# Patient Record
Sex: FEMALE | Race: WHITE | NOT HISPANIC OR LATINO | Employment: OTHER | URBAN - METROPOLITAN AREA
[De-identification: names, ages, dates, MRNs, and addresses within clinical notes are randomized per-mention and may not be internally consistent; named-entity substitution may affect disease eponyms.]

---

## 2018-08-01 ENCOUNTER — OFFICE VISIT (OUTPATIENT)
Dept: ENDOCRINOLOGY | Facility: HOSPITAL | Age: 68
End: 2018-08-01
Payer: COMMERCIAL

## 2018-08-01 VITALS
HEIGHT: 67 IN | HEART RATE: 82 BPM | SYSTOLIC BLOOD PRESSURE: 120 MMHG | WEIGHT: 193.8 LBS | BODY MASS INDEX: 30.42 KG/M2 | DIASTOLIC BLOOD PRESSURE: 78 MMHG

## 2018-08-01 DIAGNOSIS — E89.0 POSTOPERATIVE HYPOTHYROIDISM: Primary | ICD-10-CM

## 2018-08-01 PROCEDURE — 99203 OFFICE O/P NEW LOW 30 MIN: CPT | Performed by: INTERNAL MEDICINE

## 2018-08-01 RX ORDER — DESOXIMETASONE 2.5 MG/G
OINTMENT TOPICAL
COMMUNITY
Start: 2018-07-23 | End: 2020-09-03 | Stop reason: ALTCHOICE

## 2018-08-01 RX ORDER — GABAPENTIN 600 MG/1
TABLET, FILM COATED ORAL DAILY
COMMUNITY
Start: 2018-07-09 | End: 2019-08-12 | Stop reason: ALTCHOICE

## 2018-08-01 RX ORDER — LEVOTHYROXINE SODIUM 0.12 MG/1
125 TABLET ORAL DAILY
Qty: 90 TABLET | Refills: 2 | Status: SHIPPED | OUTPATIENT
Start: 2018-08-01 | End: 2019-05-15 | Stop reason: SDUPTHER

## 2018-08-01 RX ORDER — LEVOTHYROXINE SODIUM 125 UG/1
125 TABLET ORAL DAILY
COMMUNITY
Start: 2018-06-12 | End: 2018-08-01 | Stop reason: SDUPTHER

## 2018-08-01 RX ORDER — ROSUVASTATIN CALCIUM 5 MG/1
5 TABLET, COATED ORAL DAILY
COMMUNITY

## 2018-08-01 RX ORDER — METOPROLOL SUCCINATE 25 MG
25 TABLET, EXTENDED RELEASE 24 HR ORAL DAILY
COMMUNITY
Start: 2018-06-27

## 2018-08-01 RX ORDER — ASPIRIN 81 MG/1
81 TABLET, CHEWABLE ORAL
COMMUNITY

## 2018-08-01 NOTE — PATIENT INSTRUCTIONS
Thyroid level is normal    No change in Synthroid dose for now  Follow up in 1 year with blood work

## 2018-08-01 NOTE — PROGRESS NOTES
8/1/2018    Assessment/Plan      Diagnoses and all orders for this visit:    Postoperative hypothyroidism  -     TSH, 3rd generation Lab Collect; Future  -     T4, free Lab Collect; Future  -     levothyroxine (SYNTHROID) 125 mcg tablet; Take 1 tablet (125 mcg total) by mouth daily    Other orders  -     aspirin 81 mg chewable tablet; Chew 81 mg  -     cholecalciferol (VITAMIN D3) 1,000 units tablet; Take 1,000 Units by mouth  -     desoximetasone (TOPICORT) 0 25 % ointment;   -     GRALISE 600 MG TABS; daily    -     Discontinue: SYNTHROID 125 MCG tablet; Take 125 mcg by mouth daily    -     TOPROL XL 25 MG 24 hr tablet; Take 25 mg by mouth daily    -     rosuvastatin (CRESTOR) 5 mg tablet; Take 5 mg by mouth daily        Assessment/Plan:  Hypothyroidism post thyroidectomy  Most recent thyroid function tests are normal  She is biochemically and clinically euthyroid  She will continue the same Synthroid 125 mcg daily  I will have her follow up in 1 year with preceding TSH and free T4       CC: thyroid consult    History of Present Illness     HPI: Savi Her is a 77y o  year old female with history of hypothyroidism post thyroidectomy  She had a history of hyperthyroidism and had total  thyroidectomy in the past   She is currently taking Synthroid 125 mcg daily  She denies heat or cold intolerance, but tends to be on the warmer side  She denies diarrhea or constipation, anxiety or depression, insomnia, tremors, or weight changes  She does have some fatigue recently and has been napping some in the afternoon  She will have occasional palpitations  She has no brittle nails or hair loss but has the red and dry and swollen skin of her hands which is under evaluation and treatment by a dermatologist   She denies diplopia  Review of Systems   Constitutional: Positive for fatigue  Negative for unexpected weight change  Has been more fatigued recently and napping in the afternoon or watching TV  HENT: Negative for hearing loss and tinnitus  Eyes: Positive for visual disturbance  Last fall 2017, lost vision on the right like a shade  Giant cell arteritis ruled out  She was in the ER and told she had a TIA  She saw neurology, had MRI, and blood work  She saw cardiology  She started to get sciatic pain down leg at that time  Had back evaluation too  Then leg started itching a lot and had shingles  Now she is gabepentin  No diplopia  Respiratory: Negative for chest tightness and shortness of breath  Cardiovascular: Positive for palpitations  Negative for chest pain and leg swelling  Occasional palpitations  Gastrointestinal: Negative for abdominal pain, constipation, diarrhea and nausea  Endocrine: Negative for cold intolerance and heat intolerance  Tends to be on the warmer side  Musculoskeletal: Negative for arthralgias and back pain  Knee pain in the right if seated awhile and then gets up  Skin: Positive for rash  Hands always dry, red, and swollen  Lots of negative blood work per rheum  Seeing derm now and on steroid cream  Has a kind of dermatitis and eczema  No other dry skin, brittle nails, or hair loss  Neurological: Positive for numbness  Negative for dizziness, tremors, light-headedness and headaches  Has neuropathy and sensitivity in left leg to foot post shingles infection last year and is on gabapentin  Psychiatric/Behavioral: Negative for dysphoric mood and sleep disturbance  The patient is not nervous/anxious  Occasional bad sleep night  Historical Information   No past medical history on file  No past surgical history on file    Social History   History   Alcohol use Not on file     History   Drug use: Unknown     History   Smoking Status    Former Smoker    Packs/day: 1 00    Years: 15 00    Types: Cigarettes    Quit date: 1979   Smokeless Tobacco    Never Used     Family History:   Family History   Problem Relation Age of Onset    Depression Mother     No Known Problems Father     Thyroid cancer Sister     Thyroid disease unspecified Sister     Diabetes type I Grandchild        Meds/Allergies   Current Outpatient Prescriptions   Medication Sig Dispense Refill    aspirin 81 mg chewable tablet Chew 81 mg      cholecalciferol (VITAMIN D3) 1,000 units tablet Take 1,000 Units by mouth      desoximetasone (TOPICORT) 0 25 % ointment       GRALISE 600 MG TABS       rosuvastatin (CRESTOR) 5 mg tablet Take 5 mg by mouth daily      SYNTHROID 125 MCG tablet       TOPROL XL 25 MG 24 hr tablet        No current facility-administered medications for this visit  Allergies   Allergen Reactions    Tetracycline        Objective   Vitals: Blood pressure 120/78, pulse 82, height 5' 7" (1 702 m), weight 87 9 kg (193 lb 12 8 oz)  Invasive Devices          No matching active lines, drains, or airways          Physical Exam   Constitutional: She is oriented to person, place, and time  She appears well-developed and well-nourished  HENT:   Head: Normocephalic and atraumatic  Eyes: Conjunctivae and EOM are normal  Pupils are equal, round, and reactive to light  No lid lag, stare, proptosis  , or periorbital edema  Neck: Normal range of motion  Neck supple  No palpable thyroid nodules  Cardiovascular: Normal rate, regular rhythm, normal heart sounds and intact distal pulses  No murmur heard  Pulmonary/Chest: Effort normal and breath sounds normal  She has no wheezes  Abdominal: Soft  Bowel sounds are normal  There is no tenderness  Musculoskeletal: Normal range of motion  She exhibits no edema or deformity  No tremor of the outstretched hands  No spinous process tenderness  No CVA tenderness  Lymphadenopathy:     She has no cervical adenopathy  Neurological: She is alert and oriented to person, place, and time  She has normal reflexes  Skin: Skin is warm and dry  No rash noted     Vitals reviewed  The history was obtained from the review of the chart and from the patient  Lab Results:    Most recent blood work doneon 06/05/2018 at [facility] showed a normal CMP with a glucose of 97  TSH is 0 357 with a free T4 1  16  CBC was normal  And 25 hydroxy vitamin-D level was slightly low at 28 5  No results found for this or any previous visit (from the past 83566 hour(s))  No future appointments

## 2019-05-15 DIAGNOSIS — E89.0 POSTOPERATIVE HYPOTHYROIDISM: ICD-10-CM

## 2019-05-15 RX ORDER — LEVOTHYROXINE SODIUM 0.12 MG/1
TABLET ORAL
Qty: 90 TABLET | Refills: 2 | Status: SHIPPED | OUTPATIENT
Start: 2019-05-15 | End: 2020-02-10

## 2019-07-09 LAB
T4 FREE SERPL-MCNC: 1.3 NG/DL (ref 0.8–1.8)
TSH SERPL-ACNC: 1.29 MIU/L (ref 0.4–4.5)

## 2019-08-12 ENCOUNTER — OFFICE VISIT (OUTPATIENT)
Dept: ENDOCRINOLOGY | Facility: HOSPITAL | Age: 69
End: 2019-08-12
Payer: COMMERCIAL

## 2019-08-12 VITALS
BODY MASS INDEX: 30.61 KG/M2 | HEART RATE: 64 BPM | HEIGHT: 67 IN | WEIGHT: 195 LBS | SYSTOLIC BLOOD PRESSURE: 128 MMHG | DIASTOLIC BLOOD PRESSURE: 80 MMHG

## 2019-08-12 DIAGNOSIS — E89.0 POSTOPERATIVE HYPOTHYROIDISM: Primary | ICD-10-CM

## 2019-08-12 DIAGNOSIS — Z86.39 HISTORY OF GRAVES' DISEASE: ICD-10-CM

## 2019-08-12 PROCEDURE — 99213 OFFICE O/P EST LOW 20 MIN: CPT | Performed by: INTERNAL MEDICINE

## 2019-08-12 RX ORDER — MYCOPHENOLATE MOFETIL 500 MG/1
TABLET ORAL EVERY 12 HOURS SCHEDULED
COMMUNITY
End: 2020-09-03 | Stop reason: ALTCHOICE

## 2019-08-12 NOTE — PATIENT INSTRUCTIONS
The thyroid blood work is normal    Continue the same levothyroxine 125 mcg daily  Follow up in 1 year with blood work

## 2019-08-12 NOTE — PROGRESS NOTES
8/12/2019    Assessment/Plan      Diagnoses and all orders for this visit:    Postoperative hypothyroidism  -     TSH, 3rd generation Lab Collect; Future  -     T4, free Lab Collect; Future    History of Graves' disease  -     TSH, 3rd generation Lab Collect; Future  -     T4, free Lab Collect; Future    Other orders  -     mycophenolate (CELLCEPT) 500 mg tablet; Take by mouth every 12 (twelve) hours        Assessment/Plan:    Hypothyroidism post thyroidectomy for Graves disease  Most recent thyroid function tests are normal   She is biochemically euthyroid  She will continue the same levothyroxine 125 mcg daily  I have asked her to follow up in 1 year with preceding TSH and free T4       CC:   Hypothyroid follow-up    History of Present Illness     HPI: Denver Hitch is a 68y o  year old female with history of   Hypothyroidism post thyroidectomy  She has a history of hyperthyroidism due to grave's disease and then had a total thyroidectomy in the past   She is currently on thyroid hormone replacement and takes  Levothyroxine 125 mcg daily  She denies heat or cold intolerance, palpitation, diarrhea or constipation, tremors, anxiety or depression  She will get a rare heart flutter in sees a cardiologist on a regular basis  She has dry skin on her hands and 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 diagnosed with dermatomyositis  She denies any other dry skin, brittle nails, or hair loss  She denies insomnia  She is fatigued  Weight is stable  She has no diplopia  She has no compressive thyroid symptoms or difficulties with swallowing  Review of Systems   Constitutional: Positive for fatigue  Negative for unexpected weight change  Has some fatigue  HENT: Negative for trouble swallowing  Eyes: Negative for visual disturbance  No diplopia  Respiratory: Negative for chest tightness and shortness of breath  Cardiovascular: Negative for chest pain and palpitations  Rare heart flutter   Sees a cardiologist at times  Gastrointestinal: Negative for abdominal pain, constipation, diarrhea and nausea  Endocrine: Negative for cold intolerance and heat intolerance  Skin:        Has been diagnosed with dermatomyositis with hand issues, redness, dryness, edema, unable to make a tight fist  Now on Cellcept  Has had significant cancer workup with this diagnosis, now has pulmonary fibrosis and seeing pulmonary  No other dry skin  No brittle nails or hair loss  Neurological: Negative for dizziness, tremors, light-headedness and headaches  Can get an occasional headache  Psychiatric/Behavioral: Negative for dysphoric mood and sleep disturbance  The patient is not nervous/anxious          Historical Information   Past Medical History:   Diagnosis Date    [facility]    Hyperlipidemia     Hypertension     Lung nodule     right    [facility]     Shingles 2017    TIA (transient ischemic attack) 2017    Vitamin D deficiency      Past Surgical History:   Procedure Laterality Date    APPENDECTOMY      CARPAL TUNNEL RELEASE Bilateral     TOE SURGERY Left     5th toe for bone spur    TONSILLECTOMY      TOTAL THYROIDECTOMY      TUBAL LIGATION Bilateral      Social History   Social History     Substance and Sexual Activity   Alcohol Use Yes    Frequency: Monthly or less    Comment: occasional     Social History     Substance and Sexual Activity   Drug Use No     Social History     Tobacco Use   Smoking Status Former Smoker    Packs/day: 1 00    Years: 15 00    Pack years: 15 00    Types: Cigarettes    Last attempt to quit:     Years since quittin 6   Smokeless Tobacco Never Used     Family History:   Family History   Problem Relation Age of Onset    Depression Mother     No Known Problems Father     Thyroid cancer Sister     Thyroid disease unspecified Sister         post thyroidectomy with C cell hyperplasia    Diabetes type I Grandchild  No Known Problems Brother     No Known Problems Daughter     No Known Problems Daughter     No Known Problems Son        Meds/Allergies   Current Outpatient Medications   Medication Sig Dispense Refill    aspirin 81 mg chewable tablet Chew 81 mg      cholecalciferol (VITAMIN D3) 1,000 units tablet Take 1,000 Units by mouth      desoximetasone (TOPICORT) 0 25 % ointment       levothyroxine 125 mcg tablet TAKE 1 TABLET DAILY 90 tablet 2    mycophenolate (CELLCEPT) 500 mg tablet Take by mouth every 12 (twelve) hours      rosuvastatin (CRESTOR) 5 mg tablet Take 5 mg by mouth daily      TOPROL XL 25 MG 24 hr tablet Take 25 mg by mouth daily         No current facility-administered medications for this visit  Allergies   Allergen Reactions    Tetracycline        Objective   Vitals: Blood pressure 128/80, pulse 64, height 5' 7" (1 702 m), weight 88 5 kg (195 lb)  Invasive Devices     None                 Physical Exam   Constitutional: She is oriented to person, place, and time  She appears well-developed and well-nourished  HENT:   Head: Normocephalic and atraumatic  Eyes: Pupils are equal, round, and reactive to light  Conjunctivae and EOM are normal      No lid lag, stare, proptosis, or periorbital edema  Neck: Normal range of motion  Neck supple  No thyromegaly present  Thyroid normal in size without palpable thyroid nodules  No bruits over the thyroid gland or carotids  Cardiovascular: Normal rate, regular rhythm and normal heart sounds  No murmur heard  Pulmonary/Chest: Effort normal and breath sounds normal  She has no wheezes  Musculoskeletal: Normal range of motion  She exhibits no edema or deformity  No tremor of the outstretched hands  Lymphadenopathy:     She has no cervical adenopathy  Neurological: She is alert and oriented to person, place, and time  She has normal reflexes  Deep tendon reflexes normal without briskness  Skin: Skin is warm and dry   No rash noted    Vitals reviewed  The history was obtained from the review of the chart and from the patient      Lab Results:      Blood work done on 07/08/2019 demonstrates the following results:  Lab Results   Component Value Date    TSH 1 29 07/08/2019    FREET4 1 3 07/08/2019         Future Appointments   Date Time Provider Eusebio Mcghee   8/17/2020 11:20 AM Ryan Gates MD [facility]

## 2020-02-09 DIAGNOSIS — E89.0 POSTOPERATIVE HYPOTHYROIDISM: ICD-10-CM

## 2020-02-10 RX ORDER — LEVOTHYROXINE SODIUM 0.12 MG/1
TABLET ORAL
Qty: 90 TABLET | Refills: 4 | Status: SHIPPED | OUTPATIENT
Start: 2020-02-10 | End: 2021-05-05

## 2020-08-14 ENCOUNTER — TELEPHONE (OUTPATIENT)
Dept: ENDOCRINOLOGY | Facility: HOSPITAL | Age: 70
End: 2020-08-14

## 2020-08-14 DIAGNOSIS — Z86.39 HISTORY OF GRAVES' DISEASE: ICD-10-CM

## 2020-08-14 DIAGNOSIS — E89.0 POSTOPERATIVE HYPOTHYROIDISM: Primary | ICD-10-CM

## 2020-08-25 LAB
T4 FREE SERPL-MCNC: 1.3 NG/DL (ref 0.8–1.8)
TSH SERPL-ACNC: 0.19 MIU/L (ref 0.4–4.5)

## 2020-09-03 ENCOUNTER — OFFICE VISIT (OUTPATIENT)
Dept: ENDOCRINOLOGY | Facility: HOSPITAL | Age: 70
End: 2020-09-03
Payer: COMMERCIAL

## 2020-09-03 VITALS
HEIGHT: 67 IN | BODY MASS INDEX: 31.55 KG/M2 | DIASTOLIC BLOOD PRESSURE: 84 MMHG | WEIGHT: 201 LBS | SYSTOLIC BLOOD PRESSURE: 124 MMHG | TEMPERATURE: 98.2 F | HEART RATE: 78 BPM

## 2020-09-03 DIAGNOSIS — E89.0 POSTOPERATIVE HYPOTHYROIDISM: Primary | ICD-10-CM

## 2020-09-03 DIAGNOSIS — Z86.39 HISTORY OF GRAVES' DISEASE: ICD-10-CM

## 2020-09-03 PROCEDURE — 99213 OFFICE O/P EST LOW 20 MIN: CPT | Performed by: INTERNAL MEDICINE

## 2020-09-03 NOTE — PROGRESS NOTES
9/4/2020    Assessment/Plan      Diagnoses and all orders for this visit:    Postoperative hypothyroidism  -     TSH, 3rd generation Lab Collect; Future  -     T4, free Lab Collect; Future  -     TSH, 3rd generation Lab Collect; Future  -     T4, free Lab Collect; Future    History of Graves' disease  -     TSH, 3rd generation Lab Collect; Future  -     T4, free Lab Collect; Future  -     TSH, 3rd generation Lab Collect; Future  -     T4, free Lab Collect; Future        Assessment/Plan:  1  Hypothyroidism post thyroidectomy  Most recent thyroid function tests do show that her thyroid is a bit on the overactive side  She is having no symptoms of hyperthyroidism at this point in that she has been on the same thyroid hormone dosage for quite a few years without any change needed  For now, continue the same levothyroxine 125 mcg daily  I will repeat her TSH and free T4 in 6 months and if her TSH is still low then I will make an adjustment in thyroid hormone dosage  She was asked to call if she has any symptoms of hyperthyroidism and we can adjust her thyroid hormone dosage sooner  2  Graves disease  She has no evidence of Graves ophthalmopathy  She will get a TSH and free T4 in 6 months  I have asked her to follow up in 1 year with preceding TSH and free T4       CC:  Hypothyroid follow-up    History of Present Illness     HPI: Ruby Zamarripa is a 65y o  year old female with history of hypothyroidism post thyroidectomy for Graves disease  She was diagnosed with hyperthyroidism due to Graves disease many years ago and had a total thyroidectomy in the past   She is currently on thyroid hormone for replacement purposes  She takes levothyroxine 125 mcg daily  Weight is 6 lb more than last visit  She does have a very rare palpitation  She denies tremors or fatigue  She does get very hot in the heat but normally has no heat or cold intolerance    She denies diarrhea or constipation, anxiety or depression, or insomnia  She has some dry skin of her hands but no brittle nails or hair loss  She denies diplopia  She denies compressive thyroid symptoms or difficulties with swallowing  She reports that she was off her Cellcept for her dermatomyositis when COVID started and now her dermatomyositis of her hands is significantly worsening and she should be going back on the Cellcept medications soon  Review of Systems   Constitutional: Positive for unexpected weight change  Negative for fatigue  Weight 6 lb more than last year  will be caring for grandkids soon as school starts  Has fatigue  HENT: Negative for trouble swallowing  Eyes: Negative for visual disturbance  No diplopia  Respiratory: Negative for chest tightness and shortness of breath  Cardiovascular: Positive for palpitations  Negative for chest pain  Very rare palpitations  Gastrointestinal: Negative for abdominal pain, constipation, diarrhea and nausea  Endocrine: Positive for heat intolerance  Negative for cold intolerance  Very hot in the heat, not as tolerant to the heat  Skin: Positive for rash  Has dermatomyositis on palms acting up off Cellcept since march 2020, needs to restart  Some dry skin of the hands  No brittle nails  No hair loss  Neurological: Positive for headaches  Negative for dizziness, tremors and light-headedness  Occasional headaches  Psychiatric/Behavioral: Negative for dysphoric mood and sleep disturbance  The patient is not nervous/anxious          Historical Information   Past Medical History:   Diagnosis Date    [facility] 2019    Hyperlipidemia     Hypertension     Lung nodule     right    Pulmonary fibrosis [facility]     Shingles fall 2017    TIA (transient ischemic attack) fall 2017    Vitamin D deficiency      Past Surgical History:   Procedure Laterality Date    APPENDECTOMY      CARPAL TUNNEL RELEASE Bilateral     TOE SURGERY Left 5th toe for bone spur    TONSILLECTOMY      TOTAL THYROIDECTOMY      TUBAL LIGATION Bilateral      Social History   Social History     Substance and Sexual Activity   Alcohol Use Yes    Frequency: Monthly or less    Comment: occasional     Social History     Substance and Sexual Activity   Drug Use No     Social History     Tobacco Use   Smoking Status Former Smoker    Packs/day:     Years: 15     Pack years: 15     Types: Cigarettes    Last attempt to quit:     Years since quittin 7   Smokeless Tobacco Never Used     Family History:   Family History   Problem Relation Age of Onset    Depression Mother     No Known Problems Father     Thyroid cancer Sister     Thyroid disease unspecified Sister         post thyroidectomy with C cell hyperplasia    Diabetes type I Grandchild     No Known Problems Brother     No Known Problems Daughter     No Known Problems Daughter     No Known Problems Son        Meds/Allergies   Current Outpatient Medications   Medication Sig Dispense Refill    aspirin 81 mg chewable tablet Chew 81 mg      cholecalciferol (VITAMIN D3) 1,000 units tablet Take 1,000 Units by mouth      levothyroxine 125 mcg tablet TAKE 1 TABLET DAILY 90 tablet 4    rosuvastatin (CRESTOR) 5 mg tablet Take 5 mg by mouth daily      TOPROL XL 25 MG 24 hr tablet Take 25 mg by mouth daily         No current facility-administered medications for this visit  Allergies   Allergen Reactions    Tetracycline        Objective   Vitals: Blood pressure 124/84, pulse 78, temperature 98 2 °F (36 8 °C), height 5' 7" (1 702 m), weight 91 2 kg (201 lb)  Invasive Devices     None                 Physical Exam  Vitals signs reviewed  Constitutional:       Appearance: Normal appearance  She is well-developed  HENT:      Head: Normocephalic and atraumatic  Eyes:      Extraocular Movements: Extraocular movements intact        Conjunctiva/sclera: Conjunctivae normal       Comments: No lid lag, stare, proptosis, or periorbital edema  Neck:      Musculoskeletal: Normal range of motion and neck supple  Thyroid: No thyromegaly  Vascular: No carotid bruit  Comments: Healed anterior neck scar  No palpable thyroid tissue  No lymphadenopathy  Cardiovascular:      Rate and Rhythm: Normal rate and regular rhythm  Heart sounds: Normal heart sounds  No murmur  Pulmonary:      Effort: Pulmonary effort is normal       Breath sounds: Normal breath sounds  No wheezing  Abdominal:      Palpations: Abdomen is soft  Musculoskeletal: Normal range of motion  General: No deformity  Right lower leg: No edema  Left lower leg: No edema  Comments: No tremor of the outstretched hands  Lymphadenopathy:      Cervical: No cervical adenopathy  Skin:     General: Skin is warm and dry  Findings: Rash present  Comments: Skin reddened on the palms of her hands  Multiple areas of cracking and dry skin of her fingers  Neurological:      Mental Status: She is alert and oriented to person, place, and time  Deep Tendon Reflexes: Reflexes are normal and symmetric  Comments: Deep tendon reflexes normal          The history was obtained from the review of the chart and from the patient      Lab Results:      Blood work done on 08/24/2020 demonstrates the following results:    Lab Results   Component Value Date    TSH 0 19 (L) 08/24/2020    FREET4 1 3 08/24/2020         Future Appointments   Date Time Provider Eusebio Mcghee   9/2/2021  4:00 PM Jovan Ghotra MD [facility]

## 2020-09-03 NOTE — PATIENT INSTRUCTIONS
The thyroid blood work is a bit on the overactive side  This can cause some of the palpitations or feelings of being hot  For now, no change in levothyroxine 125 mcg daily  Recheck thyroid blood work in 6 months  Follow up in 1 year with blood work

## 2021-01-28 ENCOUNTER — TELEPHONE (OUTPATIENT)
Dept: ENDOCRINOLOGY | Facility: HOSPITAL | Age: 71
End: 2021-01-28

## 2021-01-28 DIAGNOSIS — E89.0 POSTOPERATIVE HYPOTHYROIDISM: Primary | ICD-10-CM

## 2021-01-28 NOTE — TELEPHONE ENCOUNTER
Reviewed her most recent thyroid lab work  It looks like her TSH is still low, and her free T4 is borderline high  Per Dr Jakie Riedel last note from her last office visit, I would recommend decreasing her levothyroxine  She can take 125 mcg 6 days a week, half tablet on Sunday will recheck lab work in 6 weeks in see if further adjustments need to be made

## 2021-01-29 ENCOUNTER — TELEPHONE (OUTPATIENT)
Dept: ENDOCRINOLOGY | Facility: HOSPITAL | Age: 71
End: 2021-01-29

## 2021-01-29 NOTE — TELEPHONE ENCOUNTER
----- Message from Kendra Foster, Matt Nelson sent at 1/29/2021  8:02 AM EST -----  Regarding: FW: Non-Urgent Medical Question  Contact: [phone]  Is there a way to make them visible?  ----- Message -----  From: David Granda  Sent: 1/28/2021   7:25 PM EST  To: , #  Subject: Non-Urgent Medical Question                      Will the latest thyroid bloodwork results be posted in my chart? I know the results were low for TSH and high for T3 and was advised to adjust dose of synthroid on Sundays to 1/2 a tablet  I would just like to see the number results  Thank you

## 2021-03-09 LAB — TSH SERPL-ACNC: 0.27 MIU/L (ref 0.4–4.5)

## 2021-03-12 DIAGNOSIS — E89.0 POSTOPERATIVE HYPOTHYROIDISM: Primary | ICD-10-CM

## 2021-03-30 DIAGNOSIS — Z23 ENCOUNTER FOR IMMUNIZATION: ICD-10-CM

## 2021-04-24 LAB
T4 FREE SERPL-MCNC: 1.4 NG/DL (ref 0.8–1.8)
TSH SERPL-ACNC: 0.96 MIU/L (ref 0.4–4.5)

## 2021-05-05 DIAGNOSIS — E89.0 POSTOPERATIVE HYPOTHYROIDISM: ICD-10-CM

## 2021-05-05 RX ORDER — LEVOTHYROXINE SODIUM 0.12 MG/1
TABLET ORAL
Qty: 90 TABLET | Refills: 3 | Status: SHIPPED | OUTPATIENT
Start: 2021-05-05 | End: 2022-05-02

## 2021-08-24 LAB
T4 FREE SERPL-MCNC: 1.2 NG/DL (ref 0.8–1.8)
TSH SERPL-ACNC: 2.67 MIU/L (ref 0.4–4.5)

## 2021-09-02 ENCOUNTER — OFFICE VISIT (OUTPATIENT)
Dept: ENDOCRINOLOGY | Facility: HOSPITAL | Age: 71
End: 2021-09-02
Payer: COMMERCIAL

## 2021-09-02 VITALS
HEIGHT: 67 IN | DIASTOLIC BLOOD PRESSURE: 90 MMHG | HEART RATE: 88 BPM | WEIGHT: 204.2 LBS | SYSTOLIC BLOOD PRESSURE: 140 MMHG | BODY MASS INDEX: 32.05 KG/M2

## 2021-09-02 DIAGNOSIS — Z86.39 HISTORY OF GRAVES' DISEASE: ICD-10-CM

## 2021-09-02 DIAGNOSIS — E89.0 POSTOPERATIVE HYPOTHYROIDISM: Primary | ICD-10-CM

## 2021-09-02 PROCEDURE — 99213 OFFICE O/P EST LOW 20 MIN: CPT | Performed by: INTERNAL MEDICINE

## 2021-09-02 RX ORDER — MYCOPHENOLATE MOFETIL 500 MG/1
1000 TABLET ORAL EVERY 12 HOURS SCHEDULED
COMMUNITY
Start: 2021-08-09

## 2021-09-02 NOTE — PATIENT INSTRUCTIONS
The thyroid blood work is normal    Continue same levothyroxine 125 mcg daily  Follow up in 1 year with blood work, we'll recheck blood work in 6 months

## 2021-09-02 NOTE — PROGRESS NOTES
9/3/2021    Assessment/Plan      Diagnoses and all orders for this visit:    Postoperative hypothyroidism  -     TSH, 3rd generation Lab Collect; Future  -     T4, free Lab Collect; Future  -     TSH, 3rd generation Lab Collect; Future  -     T4, free Lab Collect; Future    History of Graves' disease  -     TSH, 3rd generation Lab Collect; Future  -     T4, free Lab Collect; Future  -     TSH, 3rd generation Lab Collect; Future  -     T4, free Lab Collect; Future    Other orders  -     mycophenolate (CELLCEPT) 500 mg tablet; 1,000 mg every 12 (twelve) hours         Assessment/Plan:    1  Hypothyroidism post thyroidectomy  Most recent thyroid function tests are normal   She has had fluctuating blood work at times so I will repeat her blood work soon  She is clinically euthyroid and will continue the same levothyroxine 125 mcg daily  2  History of Graves disease  She has no evidence of Graves ophthalmopathy  I have asked her to follow up in 1 year with preceding TSH and free T4 but I will have her repeat a TSH and free T4 in 6 months  CC:   Hypothyroid follow-up    History of Present Illness     HPI: [facility] is a 66y o  year old female with history of  Hypothyroidism post thyroidectomy for hyperthyroidism due to Graves disease  She was diagnosed with hyperthyroidism due to Graves disease many years ago and had a total thyroidectomy  She is currently on thyroid hormone for replacement purposes  She takes levothyroxine 125 mcg 1 tablet 6 days a week and none on sunday  She can be fatigued at times  She has no insomnia, diarrhea constipation, anxiety or depression  She denies heat or cold intolerance  She has occasional palpitations  She denies tremors or weight changes  She has some unchanged dry skin but her dermatomyositis rash is improved with restarting CellCept  She has some increase in hair loss but no brittle nails  She denies diplopia    She denies compressive thyroid symptoms or difficulties with swallowing  Has now had covid vaccine and a booster  Review of Systems   Constitutional: Positive for fatigue  Negative for unexpected weight change  Can be tired at times  HENT: Negative for trouble swallowing  Eyes: Negative for visual disturbance  No diplopia  Respiratory: Negative for chest tightness and shortness of breath  Cardiovascular: Positive for palpitations  Negative for chest pain  Occasional palpitations  Gastrointestinal: Negative for abdominal pain, constipation, diarrhea and nausea  Endocrine: Negative for cold intolerance and heat intolerance  Skin: Negative for rash  Some dry skin unchanged  Dermatomyositis rash on hands improved and back on Cellcept  No brittle nails Some increase in hair loss  Neurological: Positive for headaches  Negative for dizziness, tremors and light-headedness  Some increase in headaches  Psychiatric/Behavioral: Negative for dysphoric mood and sleep disturbance  The patient is not nervous/anxious   to have hip replacement in sept         Historical Information   Past Medical History:   Diagnosis Date    Dermatomyos[facility] ([facility] [address] ) 2019    Hyperlipidemia     Hypertension     Lung nodule     right    [facility]     Shingles fall 2017    TIA (transient ischemic attack) fall 2017    Vitamin D deficiency      Past Surgical History:   Procedure Laterality Date    APPENDECTOMY      CARPAL TUNNEL RELEASE Bilateral     TOE SURGERY Left     5th toe for bone spur    TONSILLECTOMY      TOTAL THYROIDECTOMY      TUBAL LIGATION Bilateral      Social History   Social History     Substance and Sexual Activity   Alcohol Use Yes    Comment: occasional     Social History     Substance and Sexual Activity   Drug Use No     Social History     Tobacco Use   Smoking Status Former Smoker    Packs/day: 1 00    Years: 15 00    Pack years: 15 00    Types: Cigarettes    Quit date: 5    Years since quittin 7   Smokeless Tobacco Never Used     Family History:   Family History   Problem Relation Age of Onset    Depression Mother     No Known Problems Father     Thyroid cancer Sister     Thyroid disease unspecified Sister         post thyroidectomy with C cell hyperplasia    Diabetes type I Grandchild     No Known Problems Brother     No Known Problems Daughter     No Known Problems Daughter     No Known Problems Son        Meds/Allergies   Current Outpatient Medications   Medication Sig Dispense Refill    aspirin 81 mg chewable tablet Chew 81 mg      cholecalciferol (VITAMIN D3) 1,000 units tablet Take 1,000 Units by mouth      levothyroxine 125 mcg tablet TAKE 1 TABLET DAILY 90 tablet 3    mycophenolate (CELLCEPT) 500 mg tablet 1,000 mg every 12 (twelve) hours       rosuvastatin (CRESTOR) 5 mg tablet Take 5 mg by mouth daily      TOPROL XL 25 MG 24 hr tablet Take 25 mg by mouth daily         No current facility-administered medications for this visit  Allergies   Allergen Reactions    Tetracycline        Objective   Vitals: Blood pressure 140/90, pulse 88, height 5' 7" (1 702 m), weight 92 6 kg (204 lb 3 2 oz)  Invasive Devices     None                 Physical Exam  Vitals reviewed  Constitutional:       Appearance: Normal appearance  She is well-developed  HENT:      Head: Normocephalic and atraumatic  Eyes:      Extraocular Movements: Extraocular movements intact  Conjunctiva/sclera: Conjunctivae normal       Comments: No lid lag, stare, proptosis, or periorbital edema  Neck:      Thyroid: No thyromegaly  Vascular: No carotid bruit  Comments: Healed anterior neck scar  No palpable thyroid tissue  Cardiovascular:      Rate and Rhythm: Normal rate and regular rhythm  Heart sounds: Normal heart sounds  No murmur heard  Pulmonary:      Effort: Pulmonary effort is normal       Breath sounds: Normal breath sounds   No wheezing  Abdominal:      Palpations: Abdomen is soft  Musculoskeletal:         General: No deformity  Normal range of motion  Cervical back: Normal range of motion and neck supple  Right lower leg: No edema  Left lower leg: No edema  Comments: No tremor of the outstretched hands  Lymphadenopathy:      Cervical: No cervical adenopathy  Skin:     General: Skin is warm and dry  Neurological:      Mental Status: She is alert and oriented to person, place, and time  Deep Tendon Reflexes: Reflexes are normal and symmetric  Comments: Deep tendon reflexes normal          The history was obtained from the review of the chart and from the patient      Lab Results:      Blood work done on 08/24/2021 demonstrates the following results:    Lab Results   Component Value Date    TSH 2 67 08/24/2021    FREET4 1 2 08/24/2021       Future Appointments   Date Time Provider Eusebio Mcghee   9/7/2022  1:00 PM Surendra Hernandez MD [facility]

## 2022-05-02 DIAGNOSIS — E89.0 POSTOPERATIVE HYPOTHYROIDISM: ICD-10-CM

## 2022-05-02 RX ORDER — LEVOTHYROXINE SODIUM 0.12 MG/1
TABLET ORAL
Qty: 90 TABLET | Refills: 3 | Status: SHIPPED | OUTPATIENT
Start: 2022-05-02

## 2022-08-22 ENCOUNTER — ANNUAL EXAM (OUTPATIENT)
Dept: OBGYN CLINIC | Facility: CLINIC | Age: 72
End: 2022-08-22

## 2022-08-22 VITALS
SYSTOLIC BLOOD PRESSURE: 124 MMHG | HEIGHT: 66 IN | WEIGHT: 208.2 LBS | DIASTOLIC BLOOD PRESSURE: 70 MMHG | BODY MASS INDEX: 33.46 KG/M2

## 2022-08-22 DIAGNOSIS — Z12.4 ENCOUNTER FOR PAPANICOLAOU SMEAR FOR CERVICAL CANCER SCREENING: ICD-10-CM

## 2022-08-22 DIAGNOSIS — Z12.31 ENCOUNTER FOR SCREENING MAMMOGRAM FOR MALIGNANT NEOPLASM OF BREAST: ICD-10-CM

## 2022-08-22 DIAGNOSIS — Z01.419 ENCOUNTER FOR GYNECOLOGICAL EXAMINATION WITHOUT ABNORMAL FINDING: Primary | ICD-10-CM

## 2022-08-22 RX ORDER — ACYCLOVIR 50 MG/G
CREAM TOPICAL AS NEEDED
COMMUNITY
Start: 2022-04-19

## 2022-08-22 RX ORDER — VALACYCLOVIR HYDROCHLORIDE 1 G/1
TABLET, FILM COATED ORAL AS NEEDED
COMMUNITY
Start: 2022-04-15

## 2022-08-22 NOTE — PROGRESS NOTES
Assessment/Plan:  Calcium 1200-1500mg + 800-1000 IU Vit D daily unless otherwise directed  Avoid falls  Exercise 150 minutes per week minimum including weight bearing exercises  PAP today then no further paps after age 72 as long as there has been adequate normal paps completed, no history of JUAREZ 2  or a more severe pap diagnosis in the last 20 years  Annual mammogram and monthly breast self exam recommended   Diagnoses and all orders for this visit:    Encounter for gynecological examination without abnormal finding  -     Thinprep Tis Pap (Refl) HPV mRNA E6/E7    Encounter for screening mammogram for malignant neoplasm of breast  -     Mammo screening bilateral w 3d & cad; Future    Encounter for Papanicolaou smear for cervical cancer screening  -     Thinprep Tis Pap (Refl) HPV mRNA E6/E7    Other orders  -     acyclovir (ZOVIRAX) 5 % cream; if needed  -     valACYclovir (VALTREX) 1,000 mg tablet; if needed          Subjective:      Patient ID: Saleem Knight is a 74 y o  female  Here for annual gyn Last seen 2020  last pap 2014 neg no concerns Denies vaginal bleeding Denies abdominal or pelvic pain Not sexually active       The following portions of the patient's history were reviewed and updated as appropriate: allergies, current medications, past family history, past medical history, past social history, past surgical history and problem list     Review of Systems   Constitutional: Negative for fatigue and unexpected weight change  Respiratory: Negative for shortness of breath  Cardiovascular: Negative for chest pain and palpitations  Gastrointestinal: Negative for abdominal distention, abdominal pain, constipation and diarrhea  Genitourinary: Negative for difficulty urinating, dyspareunia, dysuria, frequency, genital sores, menstrual problem, pelvic pain, urgency, vaginal bleeding, vaginal discharge and vaginal pain  Neurological: Negative for headaches  Psychiatric/Behavioral: Negative  Negative for dysphoric mood  The patient is not nervous/anxious            Objective:      /70   Ht 5' 6" (1 676 m)   Wt 94 4 kg (208 lb 3 2 oz)   Breastfeeding No   BMI 33 60 kg/m²          Physical Exam

## 2022-08-25 LAB
CLINICAL INFO: NORMAL
CYTOLOGY CMNT CVX/VAG CYTO-IMP: NORMAL
DATE PREVIOUS BX: NORMAL
LMP START DATE: NORMAL
SL AMB PREV. PAP:: NORMAL
SPECIMEN SOURCE CVX/VAG CYTO: NORMAL
STAT OF ADQ CVX/VAG CYTO-IMP: NORMAL

## 2022-09-06 NOTE — PATIENT INSTRUCTIONS
Calcium 1200-1500mg + 800-1000 IU Vit D daily unless otherwise directed  Avoid falls  Exercise 150 minutes per week minimum including weight bearing exercises  PAP today then no further paps after age 72 as long as there has been adequate normal paps completed, no history of JUAREZ 2  or a more severe pap diagnosis in the last 20 years  Annual mammogram and monthly breast self exam recommended

## 2022-09-08 LAB
T4 FREE SERPL-MCNC: 1.3 NG/DL (ref 0.8–1.8)
TSH SERPL-ACNC: 3.37 MIU/L (ref 0.4–4.5)

## 2022-09-26 ENCOUNTER — OFFICE VISIT (OUTPATIENT)
Dept: ENDOCRINOLOGY | Facility: HOSPITAL | Age: 72
End: 2022-09-26
Payer: COMMERCIAL

## 2022-09-26 VITALS
WEIGHT: 209.2 LBS | HEIGHT: 66 IN | BODY MASS INDEX: 33.62 KG/M2 | SYSTOLIC BLOOD PRESSURE: 124 MMHG | HEART RATE: 76 BPM | DIASTOLIC BLOOD PRESSURE: 80 MMHG

## 2022-09-26 DIAGNOSIS — E89.0 POSTOPERATIVE HYPOTHYROIDISM: Primary | ICD-10-CM

## 2022-09-26 DIAGNOSIS — Z86.39 HISTORY OF GRAVES' DISEASE: ICD-10-CM

## 2022-09-26 PROCEDURE — 99213 OFFICE O/P EST LOW 20 MIN: CPT | Performed by: INTERNAL MEDICINE

## 2022-09-26 NOTE — PROGRESS NOTES
9/26/2022    Assessment/Plan      Diagnoses and all orders for this visit:    Postoperative hypothyroidism  -     TSH, 3rd generation Lab Collect; Future  -     T4, free Lab Collect; Future  -     TSH, 3rd generation Lab Collect  -     T4, free Lab Collect  -     TSH, 3rd generation Lab Collect; Future  -     T4, free Lab Collect; Future  -     TSH, 3rd generation Lab Collect  -     T4, free Lab Collect    History of Graves' disease  -     TSH, 3rd generation Lab Collect; Future  -     T4, free Lab Collect; Future  -     TSH, 3rd generation Lab Collect  -     T4, free Lab Collect  -     TSH, 3rd generation Lab Collect; Future  -     T4, free Lab Collect; Future  -     TSH, 3rd generation Lab Collect  -     T4, free Lab Collect        Assessment/Plan:  1  Hypothyroidism post thyroidectomy  Most recent thyroid function tests do show an elevated TSH in the high-normal range  Her TSH is demonstrating some biochemical hypothyroidism  I have asked her to increase her Synthroid to 125 mcg 1 tablet on a daily basis  2  History of Graves disease  She is no evidence of Graves ophthalmopathy  I have asked her to repeat a TSH and free T4 in 6 months  I have asked her to follow up in 1 year with preceding TSH and free T4       CC:  Hypothyroid and Graves disease follow-up    History of Present Illness     HPI: Evie Rodriguez is a 74y o  year old female with history of hypothyroidism post thyroidectomy for hyperthyroidism due to Graves disease  She was diagnosed with Graves hyperthyroidism due to Graves disease many years ago and had a total thyroidectomy  She is on thyroid hormone for replacement purposes  She is currently on levothyroxine 125 mcg 1 tablet 6 days a week and none on Sunday  She still has occasional palpitations and does see a cardiologist   She has some hair loss but no brittle nails or dry skin  She denies heat or cold intolerance, tremors, fatigue, or weight changes    She denies anxiety or depression, diarrhea or constipation, or insomnia  She denies diplopia  She denies compressive thyroid symptoms or difficulties with swallowing  Review of Systems   Constitutional: Negative for fatigue and unexpected weight change  HENT: Negative for trouble swallowing  Eyes: Negative for visual disturbance  No diplopia  Respiratory: Negative for chest tightness and shortness of breath  Cardiovascular: Positive for palpitations  Negative for chest pain  Still occasional palpitations  Sees cardiology, uses Toprol  Gastrointestinal: Negative for abdominal pain, constipation, diarrhea and nausea  Endocrine: Negative for cold intolerance and heat intolerance  Skin: Negative for rash  No dry skin  No brittle nails  Some hair loss  Neurological: Negative for dizziness, tremors, light-headedness and headaches  Had shaky feeling at 1 pm, felt shaky inside and was hungry yesterday, had eaten 2 eggo waffles around 8:30 am  This has happened once every 6 months  Psychiatric/Behavioral: Negative for dysphoric mood and sleep disturbance  The patient is not nervous/anxious  Historical Information   Past Medical History:   Diagnosis Date    Dermatomyositis ([facility] [address] ) 2019    History of bone scan 02/23/2021    osteopenia    History of colonoscopy 10/2020    Negative per pt  Return in 5 years   History of screening mammography 01/17/2017    Bi-Rads 2     History of screening mammography 02/2022    Negative per pt   Done at [facility]    Hyperlipidemia     Hypertension     Interstitial lung disease ([facility] [address] )     Lung nodule     right    Lung nodule     Pulmonary fibrosis ([facility] [address] [facility]     Shingles fall 2017    TIA (transient ischemic attack) fall 2017    Vitamin D deficiency      Past Surgical History:   Procedure Laterality Date    APPENDECTOMY      CARPAL TUNNEL RELEASE Bilateral     TOE SURGERY Left     5th toe for bone spur    TONSILLECTOMY      TOTAL THYROIDECTOMY      TUBAL LIGATION Bilateral      Social History   Social History     Substance and Sexual Activity   Alcohol Use Yes    Comment: occasional     Social History     Substance and Sexual Activity   Drug Use Never     Social History     Tobacco Use   Smoking Status Former Smoker    Packs/day: 1 00    Years: 15     Pack years: 15     Types: Cigarettes    Quit date: 5    Years since quittin 7   Smokeless Tobacco Never Used     Family History:   Family History   Problem Relation Age of Onset    Depression Mother     No Known Problems Father     Thyroid cancer Sister     Thyroid disease unspecified Sister         post thyroidectomy with C cell hyperplasia    Diabetes type I Grandchild     No Known Problems Brother     No Known Problems Daughter     No Known Problems Daughter     No Known Problems Son        Meds/Allergies   Current Outpatient Medications   Medication Sig Dispense Refill    acyclovir (ZOVIRAX) 5 % cream if needed      aspirin 81 mg chewable tablet Chew 81 mg      cholecalciferol (VITAMIN D3) 1,000 units tablet Take 1,000 Units by mouth      levothyroxine 125 mcg tablet TAKE 1 TABLET DAILY 90 tablet 3    mycophenolate (CELLCEPT) 500 mg tablet 1,000 mg every 12 (twelve) hours       rosuvastatin (CRESTOR) 5 mg tablet Take 5 mg by mouth daily      TOPROL XL 25 MG 24 hr tablet Take 25 mg by mouth daily        valACYclovir (VALTREX) 1,000 mg tablet if needed       No current facility-administered medications for this visit  Allergies   Allergen Reactions    Tetracycline     Epinephrine Palpitations       Objective   Vitals: Blood pressure 124/80, pulse 76, height 5' 6" (1 676 m), weight 94 9 kg (209 lb 3 2 oz), not currently breastfeeding  Invasive Devices  Report    None                 Physical Exam  Vitals reviewed  Constitutional:       Appearance: Normal appearance  She is well-developed  HENT:      Head: Normocephalic and atraumatic     Eyes: Extraocular Movements: Extraocular movements intact  Conjunctiva/sclera: Conjunctivae normal       Comments: No lid lag, stare, proptosis, or periorbital edema  Neck:      Thyroid: No thyromegaly  Vascular: No carotid bruit  Comments: Healed anterior neck scar  No palpable thyroid tissue  Cardiovascular:      Rate and Rhythm: Normal rate and regular rhythm  Heart sounds: Normal heart sounds  No murmur heard  Pulmonary:      Effort: Pulmonary effort is normal       Breath sounds: Normal breath sounds  No wheezing  Abdominal:      Palpations: Abdomen is soft  Musculoskeletal:         General: No deformity  Normal range of motion  Cervical back: Normal range of motion and neck supple  Right lower leg: No edema  Left lower leg: No edema  Comments: No tremor of the outstretched hands  Lymphadenopathy:      Cervical: No cervical adenopathy  Skin:     General: Skin is warm and dry  Findings: No rash  Neurological:      Mental Status: She is alert and oriented to person, place, and time  Deep Tendon Reflexes: Reflexes are normal and symmetric  Comments: Patellar deep tendon reflexes normal          The history was obtained from the review of the chart and from the patient      Lab Results:      Blood work done on 09/08/2022 demonstrates the following results:    Lab Results   Component Value Date    TSH 3 37 09/08/2022    FREET4 1 3 09/08/2022             Future Appointments   Date Time Provider Eusebio Mcghee   9/26/2023 11:20 AM Heidi Thornton MD [facility]

## 2022-09-26 NOTE — PATIENT INSTRUCTIONS
Your thyroid is going on the underactive side  Let's  have you start taking 1 synthroid every day again       Recheck blood work in 6 months  Follow up in 1 year with blood work

## 2023-04-25 DIAGNOSIS — E89.0 POSTOPERATIVE HYPOTHYROIDISM: ICD-10-CM

## 2023-04-25 RX ORDER — LEVOTHYROXINE SODIUM 0.12 MG/1
TABLET ORAL
Qty: 90 TABLET | Refills: 3 | Status: SHIPPED | OUTPATIENT
Start: 2023-04-25

## 2023-07-19 ENCOUNTER — TELEPHONE (OUTPATIENT)
Dept: OBGYN CLINIC | Facility: CLINIC | Age: 73
End: 2023-07-19

## 2023-07-19 NOTE — TELEPHONE ENCOUNTER
Pt called informing on Saturday she noticed pink spotting upon wiping, later that day had another episode of bright red blood with wiping. This morning she reports she had another trace of pink spotting upon wiping. Pt denies any vaginal pain, irritation discomfort or urinary symptoms for "UTI or cystitis."  Denies any rectal pain or signs bleeding is coming from rectum. Pt does report she had a recent Cortisone injection for bursitis of hip on 6/29/23 and also taking Meloxicam.   Transferred to reception to schedule OV for further evaluation of PMB.

## 2023-07-20 ENCOUNTER — OFFICE VISIT (OUTPATIENT)
Dept: OBGYN CLINIC | Facility: CLINIC | Age: 73
End: 2023-07-20
Payer: COMMERCIAL

## 2023-07-20 VITALS — DIASTOLIC BLOOD PRESSURE: 84 MMHG | WEIGHT: 206 LBS | BODY MASS INDEX: 33.25 KG/M2 | SYSTOLIC BLOOD PRESSURE: 130 MMHG

## 2023-07-20 DIAGNOSIS — N95.0 PMB (POSTMENOPAUSAL BLEEDING): Primary | ICD-10-CM

## 2023-07-20 DIAGNOSIS — L98.9 SKIN LESION OF LEFT LOWER EXTREMITY: ICD-10-CM

## 2023-07-20 DIAGNOSIS — M33.90 DERMATOMYOSITIS (HCC): ICD-10-CM

## 2023-07-20 PROBLEM — M33.13 DERMATOMYOSITIS (HCC): Status: ACTIVE | Noted: 2023-07-20

## 2023-07-20 PROCEDURE — 99214 OFFICE O/P EST MOD 30 MIN: CPT | Performed by: NURSE PRACTITIONER

## 2023-07-20 PROCEDURE — 58100 BIOPSY OF UTERUS LINING: CPT | Performed by: NURSE PRACTITIONER

## 2023-07-20 RX ORDER — MELOXICAM 7.5 MG/1
7.5 TABLET ORAL DAILY
COMMUNITY
Start: 2023-07-03

## 2023-07-20 NOTE — PROGRESS NOTES
Assessment/Plan:  Reviewed possible causes of PMB including endometrial or vaginal atrophy, endometrial polyp, fibroids, endometrial hyperplasia/cancer, cervical bleeding. Endometrial bx today with ease 3 passes with scant tissue obtained Discussed US to evaluate endometrial lining  Likely related to steroid injection   F/u pending review of studies  F/u with derm next week as planned showed pt area of concern with mirror  F/u podiatrist today for foot injury/ poss cellulitis        Diagnoses and all orders for this visit:    PMB (postmenopausal bleeding)  -     US pelvis complete w transvaginal; Future  -     Tissue Pathology    Dermatomyositis ([address])    Skin lesion of left lower extremity  Comments:  has appt with derm 7/27/203 make sure she evaluates     Other orders  -     meloxicam (MOBIC) 7.5 mg tablet; Take 7.5 mg by mouth daily          Subjective:      Patient ID: Estrellita Kumar is a 74 y.o. female. Here for eval PMB had few days spotting &/15 & 7/16 just when wiped and on her underwear. Not a lot She then had a small amount yesterday No pain or cramping Had received steroid injection 6/29/2023 left hipand has been on Meloxicam   Last pap 8/2022 neg. No h.o abn pap She had a tooth pulled 7/6 and was on abx  She also had a recent fall tripped over her phone  Left foot red/hot and pain toes look abnormal  Has an appt with foot doctor at 3 pm today. She follows with derm for Dermatomycositis On Cell cept and has an appointment 7/27       The following portions of the patient's history were reviewed and updated as appropriate: allergies, current medications, past family history, past medical history, past social history, past surgical history and problem list.    Review of Systems   Constitutional: Negative for chills and fever. Gastrointestinal: Negative for abdominal pain, constipation and diarrhea. Genitourinary: Positive for vaginal bleeding. Negative for pelvic pain and vaginal pain. Musculoskeletal: Positive for arthralgias. Objective:      /84   Wt 93.4 kg (206 lb)   BMI 33.25 kg/m²          Physical Exam  Vitals and nursing note reviewed. Constitutional:       Appearance: Normal appearance. HENT:      Head: Normocephalic and atraumatic. Pulmonary:      Effort: Pulmonary effort is normal.   Chest:   Breasts: Kamron Score is 5. Breasts are symmetrical.      Right: Normal. No mass, nipple discharge, skin change or tenderness. Left: Normal. No mass, nipple discharge, skin change or tenderness. Abdominal:      General: There is no distension. Palpations: Abdomen is soft. There is no mass. Tenderness: There is no abdominal tenderness. Genitourinary:     General: Normal vulva. Exam position: Lithotomy position. Pubic Area: No rash. Vagina: Normal.      Cervix: No cervical motion tenderness or lesion. Uterus: Normal.       Adnexa:         Right: No mass, tenderness or fullness. Left: No mass, tenderness or fullness. Comments: 2 cm irregular border raised erythematous lesion left inner thigh   Lymphadenopathy:      Upper Body:      Right upper body: No axillary adenopathy. Left upper body: No axillary adenopathy. Lower Body: No right inguinal adenopathy. No left inguinal adenopathy. Neurological:      General: No focal deficit present. Mental Status: She is alert and oriented to person, place, and time. Psychiatric:         Mood and Affect: Mood normal.         Behavior: Behavior normal.         Thought Content:  Thought content normal.         Judgment: Judgment normal.       Endometrial biopsy    Date/Time: 7/20/2023 11:00 AM    Performed by: ROSEANN Issa  Authorized by: ROSEANN Issa  Universal Protocol:  Procedure performed by:  Risks and benefits: risks, benefits and alternatives were discussed  Consent given by: patient  Patient understanding: patient states understanding of the procedure being performed  Patient identity confirmed: verbally with patient      Indication:     Indications: Post-menopausal bleeding      Chronicity of post-menopausal bleeding:  New    Progression of post-menopausal bleeding:  Partially resolved  Pre-procedure:     Premeds:  Acetaminophen  Procedure:     Procedure: endometrial biopsy with Pipelle      A bivalve speculum was placed in the vagina: yes      Cervix cleaned and prepped: yes      The cervix was dilated: no      Uterus sounded: yes      Uterus sound depth (cm):  7    Specimen collected: specimen collected and sent to pathology      Patient tolerated procedure well with no complications: yes    Comments:     Procedure comments:  Scant tissue with 3 passes EMB with ease  To call with fever/chills next 1-2 days or heavy bleeding

## 2023-07-20 NOTE — PATIENT INSTRUCTIONS
Reviewed possible causes of PMB including endometrial or vaginal atrophy, endometrial polyp, fibroids, endometrial hyperplasia/cancer, cervical bleeding.  Endometrial bx today with ease 3 passes with scant tissue obtained Discussed US to evaluate endometrial lining  Likely related to steroid injection   F/u pending review of studies  F/u with derm next week as planned showed pt area of concern with mirror  F/u podiatrist today for foot injury/ poss cellulitis

## 2023-07-26 ENCOUNTER — TELEPHONE (OUTPATIENT)
Dept: OBGYN CLINIC | Facility: CLINIC | Age: 73
End: 2023-07-26

## 2023-07-26 NOTE — TELEPHONE ENCOUNTER
Pt called for biopsy results from 7/20/23, results have not yet been received. Call placed to [facility], spoke with representative Clark to inquire if biopsy results are completed. A copy of completed results have been received and sent to be scanned into chart. Once received, sy review and advise any need for follow-up.

## 2023-07-27 NOTE — TELEPHONE ENCOUNTER
Left benign biopsy results. Reminded pt to get Pelvic U/S testing done. Once receive those results, Stephen Stern will discuss further treatment plan.

## 2023-08-07 ENCOUNTER — TELEPHONE (OUTPATIENT)
Dept: OBGYN CLINIC | Facility: CLINIC | Age: 73
End: 2023-08-07

## 2023-08-07 NOTE — TELEPHONE ENCOUNTER
Called pt Had PMB following steroid injection  US normal EMS 4 mm bx benign everything appears to be ok.  Call with questions/concerns or further bleeding

## 2023-08-24 LAB
T4 FREE SERPL-MCNC: 1.54 NG/DL (ref 0.82–1.77)
T4 SERPL-MCNC: 9.1 UG/DL (ref 4.5–12)
TSH SERPL DL<=0.005 MIU/L-ACNC: 1.04 UIU/ML (ref 0.45–4.5)

## 2023-09-26 ENCOUNTER — OFFICE VISIT (OUTPATIENT)
Dept: ENDOCRINOLOGY | Facility: HOSPITAL | Age: 73
End: 2023-09-26
Payer: COMMERCIAL

## 2023-09-26 VITALS
HEART RATE: 79 BPM | BODY MASS INDEX: 33.56 KG/M2 | DIASTOLIC BLOOD PRESSURE: 82 MMHG | SYSTOLIC BLOOD PRESSURE: 130 MMHG | WEIGHT: 208.8 LBS | HEIGHT: 66 IN

## 2023-09-26 DIAGNOSIS — E89.0 POSTOPERATIVE HYPOTHYROIDISM: Primary | ICD-10-CM

## 2023-09-26 DIAGNOSIS — Z86.39 HISTORY OF GRAVES' DISEASE: ICD-10-CM

## 2023-09-26 PROCEDURE — 99213 OFFICE O/P EST LOW 20 MIN: CPT | Performed by: INTERNAL MEDICINE

## 2023-09-26 NOTE — PATIENT INSTRUCTIONS
The thyroid blood work is normal.    Continue the same synthroid/levothyroxine  125 mcg daily. Follow up in 1 year with blood work.

## 2023-09-26 NOTE — PROGRESS NOTES
9/27/2023    Assessment/Plan      Diagnoses and all orders for this visit:    Postoperative hypothyroidism  -     TSH, 3rd generation Lab Collect; Future  -     T4, free Lab Collect; Future  -     TSH, 3rd generation Lab Collect  -     T4, free Lab Collect    History of Graves' disease  -     TSH, 3rd generation Lab Collect; Future  -     T4, free Lab Collect; Future  -     TSH, 3rd generation Lab Collect  -     T4, free Lab Collect        Assessment/Plan:  1. Hypothyroidism post thyroidectomy. Most recent thyroid function studies are normal.  She is biochemically euthyroid and will continue the same levothyroxine 125 mcg daily. 2.  History of Graves' disease. She has no evidence of Graves' ophthalmopathy. I have asked her to follow-up in 1 year with preceding TSH and free T4.      CC: Hypothyroid follow-up    History of Present Illness     HPI: Carine Friend is a 74y.o. year old female with history of hypothyroidism post thyroidectomy for hyperthyroidism due to Graves' disease. She was diagnosed with hyperthyroidism due to Graves' disease many years ago and had a total thyroidectomy. She is on thyroid hormone for replacement purposes. She is currently taking Synthroid 125 mcg daily. She denies heat or cold intolerance, tremors, or weight changes. She does have some fatigue. She has occasional palpitations. She has some dry skin in her hands are improving with her treatment. She has brittle nails but no hair loss. She denies diarrhea or constipation, anxiety or depression, or insomnia. She denies diplopia. She denies compressive thyroid symptoms or difficulties with swallowing. Review of Systems   Constitutional:  Positive for fatigue. Negative for unexpected weight change. Some fatigue. HENT:  Negative for trouble swallowing. Eyes:  Negative for visual disturbance. No diplopia. Respiratory:  Negative for chest tightness and shortness of breath. Cardiovascular:  Positive for palpitations. Negative for chest pain. Occasional palpitations. Gastrointestinal:  Negative for abdominal pain, constipation, diarrhea and nausea. Endocrine: Negative for cold intolerance and heat intolerance. Musculoskeletal:         Bursitis right hip. Got injection 2-3 months ago and helped, pain coming back some. Skin:  Negative for rash. Some dry skin. Hands are improve with treatment. Has brittle nails. No hair loss. Neurological:  Negative for dizziness, tremors, light-headedness and headaches. Psychiatric/Behavioral:  Negative for dysphoric mood and sleep disturbance. The patient is not nervous/anxious. Historical Information   Past Medical History:   Diagnosis Date    Dermatomyositis ([address])     History of bone scan 2021    osteopenia    History of colonoscopy 10/2020    Negative per pt. Return in 5 years. History of screening mammography 2017    Bi-Rads 2. History of screening mammography 2022    Negative per pt.  Done at [facility]    Hyperlipidemia     Hypertension     Interstitial lung disease ([address])     Lung nodule     right    Lung nodule     Pulmonary fibrosis ([address])     Shingles fall     TIA (transient ischemic attack) fall     Vitamin D deficiency      Past Surgical History:   Procedure Laterality Date    APPENDECTOMY      CARPAL TUNNEL RELEASE Bilateral     TOE SURGERY Left     5th toe for bone spur    TONSILLECTOMY      TOTAL THYROIDECTOMY      TUBAL LIGATION Bilateral      Social History   Social History     Substance and Sexual Activity   Alcohol Use Yes    Comment: occasional     Social History     Substance and Sexual Activity   Drug Use Never     Social History     Tobacco Use   Smoking Status Former    Packs/day: 1.00    Years: 15.00    Total pack years: 15.00    Types: Cigarettes    Quit date:     Years since quittin.7   Smokeless Tobacco Never     Family History:   Family History Problem Relation Age of Onset    Depression Mother     No Known Problems Father     Thyroid cancer Sister     Thyroid disease unspecified Sister         post thyroidectomy with C cell hyperplasia    Diabetes type I Grandchild     No Known Problems Brother     No Known Problems Daughter     No Known Problems Daughter     No Known Problems Son        Meds/Allergies   Current Outpatient Medications   Medication Sig Dispense Refill    acyclovir (ZOVIRAX) 5 % cream if needed      aspirin 81 mg chewable tablet Chew 81 mg      cholecalciferol (VITAMIN D3) 1,000 units tablet Take 1,000 Units by mouth      levothyroxine 125 mcg tablet TAKE 1 TABLET DAILY 90 tablet 3    mycophenolate (CELLCEPT) 500 mg tablet 1,000 mg every 12 (twelve) hours       rosuvastatin (CRESTOR) 5 mg tablet Take 5 mg by mouth daily      TOPROL XL 25 MG 24 hr tablet Take 25 mg by mouth daily        valACYclovir (VALTREX) 1,000 mg tablet if needed      meloxicam (MOBIC) 7.5 mg tablet Take 7.5 mg by mouth daily (Patient not taking: Reported on 9/26/2023)       No current facility-administered medications for this visit. Allergies   Allergen Reactions    Tetracycline     Epinephrine Palpitations       Objective   Vitals: Blood pressure 130/82, pulse 79, height 5' 6" (1.676 m), weight 94.7 kg (208 lb 12.8 oz), not currently breastfeeding. Invasive Devices       None                   Physical Exam  Vitals reviewed. Constitutional:       Appearance: Normal appearance. She is well-developed. HENT:      Head: Normocephalic and atraumatic. Eyes:      Extraocular Movements: Extraocular movements intact. Conjunctiva/sclera: Conjunctivae normal.      Comments: No lid lag, stare, proptosis, or periorbital edema. Neck:      Thyroid: No thyromegaly. Vascular: No carotid bruit. Comments: Thyroid normal in size. No palpable thyroid nodules. Cardiovascular:      Rate and Rhythm: Normal rate and regular rhythm.       Heart sounds: Normal heart sounds. No murmur heard. Pulmonary:      Effort: Pulmonary effort is normal.      Breath sounds: Normal breath sounds. No wheezing. Abdominal:      Palpations: Abdomen is soft. Musculoskeletal:         General: No deformity. Cervical back: Normal range of motion and neck supple. Right lower leg: No edema. Left lower leg: No edema. Comments: No tremor of the outstretched hands. Lymphadenopathy:      Cervical: No cervical adenopathy. Skin:     General: Skin is warm and dry. Findings: No rash. Neurological:      Mental Status: She is alert and oriented to person, place, and time. Deep Tendon Reflexes: Reflexes are normal and symmetric. Comments: Patellar deep tendon reflexes normal.         The history was obtained from the review of the chart and from the patient.     Lab Results:      Blood work performed on 8/23/2023 demonstrates the following results:    Lab Results   Component Value Date    TSH 1.040 08/23/2023    [facility] 1.54 08/23/2023         Future Appointments   Date Time Provider [address] [place] [address]   9/27/2024 11:20 AM Josafat Siu MD [address]

## 2024-03-13 ENCOUNTER — TELEPHONE (OUTPATIENT)
Dept: OBGYN CLINIC | Facility: CLINIC | Age: 74
End: 2024-03-13

## 2024-03-13 NOTE — TELEPHONE ENCOUNTER
Lisa wanted to thank provider for picking up on an unusual spot during her last exam. She saw a Dermatologist as recommended and found out Squamous Cells found and she is now scheduled on 3/22 to have surgery to remove. She said that she will have ask them to forward all Pathology reports to us for her chart.

## 2024-06-04 DIAGNOSIS — E89.0 POSTOPERATIVE HYPOTHYROIDISM: ICD-10-CM

## 2024-06-04 RX ORDER — LEVOTHYROXINE SODIUM 0.12 MG/1
TABLET ORAL
Qty: 90 TABLET | Refills: 1 | Status: SHIPPED | OUTPATIENT
Start: 2024-06-04

## 2024-09-09 LAB
T4 FREE SERPL-MCNC: 1.3 NG/DL (ref 0.8–1.8)
TSH SERPL-ACNC: 0.16 MIU/L (ref 0.4–4.5)

## 2024-09-13 NOTE — PROGRESS NOTES
Assessment/Plan:  Calcium 1200-1500mg + 800-1000 IU Vit D daily unless otherwise directed. Avoid falls. Exercise 150 minutes per week minimum including weight bearing exercises.  Annual mammogram and monthly breast self exam recommended .   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 female.    Here for annual gyn Seen last 7/20/2023 with PMB had few days spotting 7/15 & 7/16 and 7/19.  just when wiped and on her underwear. Not a lot No pain or cramping Had received steroid injection 6/29/2023 left hip and has been on Meloxicam EMB 7/20/2023 scant benign endometrium US 7/27/2023 normal with a 4 mm stripe  She has had some spotting again following steroid injections She denies abd or pelvic pain  Bowel and bladder are normal Last pap 8/2022 neg. No h.o abn pap Derm bx SCC insitu left thigh She is due for mammogram         The following portions of the patient's history were reviewed and updated as appropriate: allergies, current medications, past family history, past medical history, past social history, past surgical history, and problem list.    Review of Systems   Constitutional:  Negative for fatigue and unexpected weight change.  "  Gastrointestinal:  Negative for abdominal distention, abdominal pain, constipation and diarrhea.   Genitourinary:  Negative for difficulty urinating, dyspareunia, dysuria, frequency, genital sores, menstrual problem, pelvic pain, urgency, vaginal bleeding, vaginal discharge and vaginal pain.   Neurological:  Negative for headaches.   Psychiatric/Behavioral: Negative.  Negative for dysphoric mood. The patient is not nervous/anxious.          Objective:      /76 (BP Location: Left arm, Patient Position: Sitting, Cuff Size: Standard)   Ht 5' 6\" (1.676 m)   Wt 93.1 kg (205 lb 3.2 oz)   BMI 33.12 kg/m²          Physical Exam  Vitals and nursing note reviewed.   Constitutional:       General: She is not in acute distress.     Appearance: Normal appearance.   HENT:      Head: Normocephalic and atraumatic.   Pulmonary:      Effort: Pulmonary effort is normal.   Chest:   Breasts:     Breasts are symmetrical.      Right: Normal. No mass, nipple discharge, skin change or tenderness.      Left: Normal. No mass, nipple discharge, skin change or tenderness.   Abdominal:      General: There is no distension.      Palpations: Abdomen is soft.      Tenderness: There is no abdominal tenderness. There is no guarding or rebound.   Genitourinary:     General: Normal vulva.      Exam position: Lithotomy position.      Labia:         Right: No rash, tenderness, lesion or injury.         Left: No rash, tenderness, lesion or injury.       Urethra: No prolapse, urethral pain, urethral swelling or urethral lesion.      Vagina: Normal. No erythema or lesions.      Cervix: No cervical motion tenderness, discharge, lesion or cervical bleeding.      Uterus: Normal.       Adnexa: Right adnexa normal and left adnexa normal.        Right: No mass or tenderness.          Left: No mass or tenderness.        Rectum: No mass or external hemorrhoid.   Musculoskeletal:         General: Normal range of motion.   Lymphadenopathy:      Upper Body: "      Right upper body: No axillary adenopathy.      Left upper body: No axillary adenopathy.      Lower Body: No right inguinal adenopathy. No left inguinal adenopathy.   Skin:     General: Skin is warm and dry.   Neurological:      Mental Status: She is alert and oriented to person, place, and time.   Psychiatric:         Mood and Affect: Mood normal.         Behavior: Behavior normal.         Thought Content: Thought content normal.         Judgment: Judgment normal.

## 2024-09-16 ENCOUNTER — ANNUAL EXAM (OUTPATIENT)
Dept: OBGYN CLINIC | Facility: CLINIC | Age: 74
End: 2024-09-16
Payer: COMMERCIAL

## 2024-09-16 VITALS
BODY MASS INDEX: 32.98 KG/M2 | WEIGHT: 205.2 LBS | HEIGHT: 66 IN | SYSTOLIC BLOOD PRESSURE: 134 MMHG | DIASTOLIC BLOOD PRESSURE: 76 MMHG

## 2024-09-16 DIAGNOSIS — Z01.419 ENCOUNTER FOR GYNECOLOGICAL EXAMINATION WITHOUT ABNORMAL FINDING: Primary | ICD-10-CM

## 2024-09-16 DIAGNOSIS — J84.9 INTERSTITIAL LUNG DISEASE (HCC): ICD-10-CM

## 2024-09-16 DIAGNOSIS — Z86.39 HISTORY OF GRAVES' DISEASE: ICD-10-CM

## 2024-09-16 DIAGNOSIS — N95.0 PMB (POSTMENOPAUSAL BLEEDING): ICD-10-CM

## 2024-09-16 DIAGNOSIS — Z12.31 ENCOUNTER FOR SCREENING MAMMOGRAM FOR MALIGNANT NEOPLASM OF BREAST: ICD-10-CM

## 2024-09-16 DIAGNOSIS — D04.72 SQUAMOUS CELL CARCINOMA IN SITU (SCCIS) OF SKIN OF LEFT THIGH: ICD-10-CM

## 2024-09-16 DIAGNOSIS — M33.13 DERMATOMYOSITIS (HCC): ICD-10-CM

## 2024-09-16 PROCEDURE — G0101 CA SCREEN;PELVIC/BREAST EXAM: HCPCS | Performed by: NURSE PRACTITIONER

## 2024-09-16 RX ORDER — MUPIROCIN 20 MG/G
OINTMENT TOPICAL
COMMUNITY
Start: 2024-03-25 | End: 2026-04-24

## 2024-09-20 PROBLEM — D04.72: Status: ACTIVE | Noted: 2024-09-20

## 2024-09-20 NOTE — PATIENT INSTRUCTIONS
Calcium 1200-1500mg + 800-1000 IU Vit D daily unless otherwise directed. Avoid falls. Exercise 150 minutes per week minimum including weight bearing exercises.  Annual mammogram and monthly breast self exam recommended .   Colonoscopy- UTD  H/o PMB with normal US EMS 4 mm Endometrial bx last year benign endometrium Recurrence again with steroid inj Will check US again  EMB pending results

## 2024-09-27 ENCOUNTER — OFFICE VISIT (OUTPATIENT)
Dept: ENDOCRINOLOGY | Facility: HOSPITAL | Age: 74
End: 2024-09-27
Payer: COMMERCIAL

## 2024-09-27 VITALS
DIASTOLIC BLOOD PRESSURE: 82 MMHG | SYSTOLIC BLOOD PRESSURE: 122 MMHG | HEIGHT: 66 IN | BODY MASS INDEX: 32.88 KG/M2 | HEART RATE: 81 BPM | WEIGHT: 204.6 LBS

## 2024-09-27 DIAGNOSIS — E89.0 POSTOPERATIVE HYPOTHYROIDISM: Primary | ICD-10-CM

## 2024-09-27 DIAGNOSIS — Z86.39 HISTORY OF GRAVES' DISEASE: ICD-10-CM

## 2024-09-27 PROCEDURE — 99214 OFFICE O/P EST MOD 30 MIN: CPT | Performed by: INTERNAL MEDICINE

## 2024-09-27 RX ORDER — LOSARTAN POTASSIUM 50 MG/1
50 TABLET ORAL DAILY
COMMUNITY

## 2024-09-27 NOTE — PROGRESS NOTES
9/29/2024    Assessment & Plan      Diagnoses and all orders for this visit:    Postoperative hypothyroidism  -     T4, free; Future  -     TSH, 3rd generation; Future  -     T4, free  -     TSH, 3rd generation  -     T4, free; Future  -     TSH, 3rd generation; Future  -     T4, free  -     TSH, 3rd generation    History of Graves' disease  -     T4, free; Future  -     TSH, 3rd generation; Future  -     T4, free  -     TSH, 3rd generation  -     T4, free; Future  -     TSH, 3rd generation; Future  -     T4, free  -     TSH, 3rd generation    Other orders  -     losartan (COZAAR) 50 mg tablet; Take 50 mg by mouth daily        Assessment & Plan  1. Hypothyroidism post thyroidectomy.  Most recent thyroid function studies show a mildly elevated TSH with a normal free T4, indicating some mild subclinical hyperthyroidism. She is not particularly symptomatic. She will continue the same levothyroxine 125 mcg daily. Thyroid blood work will be repeated in 6 months to determine if this is a transient abnormality. If her TSH remains low in 6 months, the levothyroxine dosage will be decreased.    2. Graves' disease.  No evidence of Graves' ophthalmopathy.    3. Dermatomyositis.  Her medication dosage has been decreased to 250 mg of CellCept in the morning and 250 mg at night. She reports no significant changes since the dosage adjustment at the end of July/beginning of August. Monitoring will continue to assess the impact of the dosage change.    4. Hypertension.  She is currently on Toprol for blood pressure management. No new issues reported. Blood pressure medication regimen will continue as prescribed.    5. Hyperlipidemia.  She is taking rosuvastatin for cholesterol management. No new issues reported. The current medication regimen will continue.      She will get a TSH and free T4 in 6 months.    I have asked her to follow-up in 1 year with preceding TSH and free T4.    CC: Hypothyroid, Graves' follow-up    History of  Present Illness    HPI: Lisa Rizo is a 74-year-old female with a history of hypothyroidism, post-thyroidectomy for hyperthyroidism due to Graves' disease.    She was diagnosed with hyperthyroidism due to Graves' disease many years ago and had a total thyroidectomy. She is on thyroid hormone for replacement purposes.     She is currently on a daily regimen of levothyroxine 125 mcg. Despite this, she reports feeling fatigued and has noticed hair loss after showering. She has experienced some weight loss and sleeps well at night, averaging 7 hours of sleep. She also reports dry skin but does not experience any nail breakage. Occasionally, she experiences heart palpitations but does not have tremors or shaky hands. She does not report any bowel irregularities such as diarrhea or constipation. She feels comfortable in air-conditioned environments and has no difficulty swallowing or double vision.    Her CellCept dosage was reduced to 250 mg twice daily around the end of July 2024 or early August 2024.    She has been diagnosed with squamous cell skin cancer on her left thigh. A biopsy was performed in January 2024, followed by a procedure in March 2024.    She is also on Toprol for blood pressure management and rosuvastatin for cholesterol control.        Historical Information   Past Medical History:   Diagnosis Date    Dermatomyositis (HCC) 2019    History of bone scan 02/23/2021    osteopenia    History of colonoscopy 10/2020    Negative per pt. Return in 5 years.    History of screening mammography 01/17/2017    Bi-Rads 2.    History of screening mammography 02/2022    Negative per pt. Done at [facility]/[place]    Hyperlipidemia     Hypertension     Interstitial lung disease (HCC)     Lung nodule     right    Lung nodule     Pulmonary fibrosis (HCC)     Shingles fall 2017    Squamous cell carcinoma of skin 2024    left thigh    TIA (transient ischemic attack) fall 2017    Vitamin D deficiency      Past Surgical  History:   Procedure Laterality Date    APPENDECTOMY      CARPAL TUNNEL RELEASE Bilateral     MOHS SURGERY Left     thigh    TOE SURGERY Left     5th toe for bone spur    TONSILLECTOMY      TOTAL THYROIDECTOMY      TUBAL LIGATION Bilateral      Social History   Social History     Substance and Sexual Activity   Alcohol Use Yes    Comment: occasional     Social History     Substance and Sexual Activity   Drug Use Never     Social History     Tobacco Use   Smoking Status Former    Current packs/day: 0.00    Average packs/day: 1 pack/day for 15.0 years (15.0 ttl pk-yrs)    Types: Cigarettes    Start date:     Quit date:     Years since quittin.7    Passive exposure: Past   Smokeless Tobacco Never     Family History:   Family History   Problem Relation Age of Onset    Depression Mother     No Known Problems Father     Thyroid cancer Sister     Thyroid disease unspecified Sister         post thyroidectomy with C cell hyperplasia    Diabetes type I Grandchild     No Known Problems Brother     No Known Problems Daughter     No Known Problems Daughter     No Known Problems Son        Meds/Allergies   Current Outpatient Medications   Medication Sig Dispense Refill    acyclovir (ZOVIRAX) 5 % cream if needed      aspirin 81 mg chewable tablet Chew 81 mg      cholecalciferol (VITAMIN D3) 1,000 units tablet Take 1,000 Units by mouth      levothyroxine 125 mcg tablet TAKE 1 TABLET DAILY 90 tablet 1    losartan (COZAAR) 50 mg tablet Take 50 mg by mouth daily      meloxicam (MOBIC) 7.5 mg tablet Take 7.5 mg by mouth daily      mupirocin (BACTROBAN) 2 % ointment Apply to affected area(s) once daily as needed      mycophenolate (CELLCEPT) 500 mg tablet 250 mg every 12 (twelve) hours      rosuvastatin (CRESTOR) 5 mg tablet Take 5 mg by mouth daily      TOPROL XL 25 MG 24 hr tablet Take 25 mg by mouth daily        valACYclovir (VALTREX) 1,000 mg tablet if needed       No current facility-administered medications for  "this visit.     Allergies   Allergen Reactions    Tetracycline     Epinephrine Palpitations       Objective   Vitals: Blood pressure 122/82, pulse 81, height 5' 6\" (1.676 m), weight 92.8 kg (204 lb 9.6 oz), not currently breastfeeding.  Invasive Devices       None                   Physical Exam    Eyes show no lid lag, stare, proptosis or periorbital edema.  Anterior neck scar is healed. No palpable thyroid tissue. No lymphadenopathy.  Lungs are clear to auscultation.  Heart exhibits a regular rate and rhythm. No murmurs.  No tremor observed in the outstretched hands. Patellar deep tendon reflexes are normal.      The history was obtained from the review of the chart and from the patient.    Lab Results:          Lab Results   Component Value Date    CREATININE 0.94 04/04/2024    CREATININE 0.83 07/24/2023    CREATININE 0.82 08/08/2022    BUN 14 04/04/2024    K 4.0 04/04/2024     04/04/2024    CO2 28 04/04/2024     eGFRcr   Date Value Ref Range Status   04/04/2024 63 >=60 mL/min/1.73 m2 Final     Comment:     Estimated glomerular filtration rate (eGFR) is calculated without a race  coefficient. Values should be interpreted in the context of the patient's full  clinical presentation. Reference: Ara Gonzalez, et al. \"A unifying approach  for GFR estimation: recommendations of the NKF-ASN task force on reassessing the  inclusion of race in diagnosing kidney disease.\" American Journal of Kidney  Diseases (2021)       Lab Results   Component Value Date    ALT 14 04/04/2024    AST 14 (L) 04/04/2024    ALKPHOS 79 04/04/2024       Lab Results   Component Value Date    TSH 0.16 (L) 09/09/2024    FREET4 1.3 09/09/2024       Future Appointments   Date Time Provider Department Center   9/29/2025 11:40 AM Elvira Juárez MD [facility]     "

## 2024-09-27 NOTE — PATIENT INSTRUCTIONS
The thyroid blood work is slightly overactive.     Continue the same levothyroxine 125 mcg .     We'll recheck blood work in 6 months.     Follow up in 1 year with blood work.

## 2024-10-24 ENCOUNTER — TELEPHONE (OUTPATIENT)
Age: 74
End: 2024-10-24

## 2024-10-24 NOTE — TELEPHONE ENCOUNTER
----- Message from ROSEANN Hernandez sent at 10/24/2024  1:22 PM EDT -----  Pls call pt with US results everything looks good. Lining is 2 mm no concerning findings Post menopausal bleeding likely related to steroid injection  Call with any further episodes

## 2024-12-02 DIAGNOSIS — E89.0 POSTOPERATIVE HYPOTHYROIDISM: ICD-10-CM

## 2024-12-03 RX ORDER — LEVOTHYROXINE SODIUM 125 UG/1
125 TABLET ORAL DAILY
Qty: 90 TABLET | Refills: 1 | Status: SHIPPED | OUTPATIENT
Start: 2024-12-03

## 2025-02-07 LAB
T4 FREE SERPL-MCNC: 1.5 NG/DL (ref 0.8–1.8)
TSH SERPL-ACNC: 0.06 MIU/L (ref 0.4–4.5)

## 2025-02-10 ENCOUNTER — TELEPHONE (OUTPATIENT)
Age: 75
End: 2025-02-10

## 2025-02-10 NOTE — TELEPHONE ENCOUNTER
Pt calling she has abnormal lab results and would like to Dr. Juárez to advise on her results from 2/7/25.

## 2025-02-11 DIAGNOSIS — E89.0 POSTOPERATIVE HYPOTHYROIDISM: Primary | ICD-10-CM

## 2025-02-11 RX ORDER — LEVOTHYROXINE SODIUM 100 UG/1
100 TABLET ORAL DAILY
Qty: 90 TABLET | Refills: 3 | Status: SHIPPED | OUTPATIENT
Start: 2025-02-11

## 2025-04-25 LAB
T4 FREE SERPL-MCNC: 1.2 NG/DL (ref 0.8–1.8)
TSH SERPL-ACNC: 1.06 MIU/L (ref 0.4–4.5)

## 2025-04-26 ENCOUNTER — RESULTS FOLLOW-UP (OUTPATIENT)
Dept: ENDOCRINOLOGY | Facility: HOSPITAL | Age: 75
End: 2025-04-26

## 2025-08-15 ENCOUNTER — TELEPHONE (OUTPATIENT)
Age: 75
End: 2025-08-15

## 2025-08-18 DIAGNOSIS — Z86.39 HISTORY OF GRAVES' DISEASE: ICD-10-CM

## 2025-08-18 DIAGNOSIS — E89.0 POSTOPERATIVE HYPOTHYROIDISM: Primary | ICD-10-CM

## 2025-08-25 LAB
T4 FREE SERPL-MCNC: 1.4 NG/DL (ref 0.8–1.8)
TSH SERPL-ACNC: 2.13 MIU/L (ref 0.4–4.5)